# Patient Record
Sex: FEMALE
[De-identification: names, ages, dates, MRNs, and addresses within clinical notes are randomized per-mention and may not be internally consistent; named-entity substitution may affect disease eponyms.]

---

## 2018-06-04 ENCOUNTER — HOSPITAL ENCOUNTER (OUTPATIENT)
Dept: HOSPITAL 88 - OR | Age: 43
Discharge: HOME | End: 2018-06-04
Attending: INTERNAL MEDICINE
Payer: COMMERCIAL

## 2018-06-04 DIAGNOSIS — K44.9: ICD-10-CM

## 2018-06-04 DIAGNOSIS — K62.1: ICD-10-CM

## 2018-06-04 DIAGNOSIS — N20.0: ICD-10-CM

## 2018-06-04 DIAGNOSIS — Z12.11: Primary | ICD-10-CM

## 2018-06-04 DIAGNOSIS — K31.7: ICD-10-CM

## 2018-06-04 DIAGNOSIS — K92.89: ICD-10-CM

## 2018-06-04 DIAGNOSIS — K29.70: ICD-10-CM

## 2018-06-04 DIAGNOSIS — K64.8: ICD-10-CM

## 2018-06-04 DIAGNOSIS — K20.9: ICD-10-CM

## 2018-06-04 DIAGNOSIS — Z88.1: ICD-10-CM

## 2018-06-04 DIAGNOSIS — R03.0: ICD-10-CM

## 2018-06-04 DIAGNOSIS — Z80.0: ICD-10-CM

## 2018-06-04 PROCEDURE — 45384 COLONOSCOPY W/LESION REMOVAL: CPT

## 2018-06-04 PROCEDURE — 43239 EGD BIOPSY SINGLE/MULTIPLE: CPT

## 2018-06-04 PROCEDURE — 45378 DIAGNOSTIC COLONOSCOPY: CPT

## 2018-06-04 PROCEDURE — 81025 URINE PREGNANCY TEST: CPT

## 2018-06-04 NOTE — OPERATIVE REPORT
DATE OF PROCEDURE:  June 04, 2018 



REFERRING PHYSICIAN:  Kirill Otto MD  



PROCEDURES PERFORMED

1. Esophagogastroduodenoscopy with biopsies.  

2. Colonoscopy with polypectomy.  



INDICATIONS FOR EGD:  Epigastric pain.  



INDICATIONS FOR COLONOSCOPY:  Colorectal cancer screening.  Personal 

history of colon polyps.  Father with colon cancer.



MEDICATION:  Patient was done under MAC.  Please see anesthesiologist's 

note.



PROCEDURE:  With the patient in the left lateral decubitus position, the 

flexible fiberoptic Olympus gastroscope was introduced into the esophagus 

under direct visualization without any difficulty.  There was some patchy 

erythema noted in the distal esophagus.  A minute nodule was noted at the 

GE junction, and that was biopsied.  The scope was then advanced with ease 

into the stomach, traversing a small hiatal hernia.  Mucosa overlying the 

antrum revealed some patchy erythema and mild to moderate edema, and 

biopsies were obtained and sent to stain for H. pylori.  Multiple 

hyperplastic-appearing polyps were noted in the body of the stomach.  Some 

were partially excised with cold biopsy forceps.  A rather long, 

submucosal, serpiginous lesion was noted in the mid body along the anterior 

wall, and this needs to be further assessed with an EUS. The pylorus was of 

normal contour and shape.  It was intubated with ease, and the scope was 

advanced all the way to the 2nd portion of the duodenum.  The scope was 

then withdrawn slowly.  Mucosa overlying the proximal 2nd portion and the 

duodenal bulb appeared to be within normal limits.  The scope was then 

withdrawn back into the stomach and retroflexed.  Mucosa overlying the 

fundus appeared to be within normal limits.  The previously described 

hiatal hernia was also noted in the retroflexed position.  The scope was 

then straightened out.  It was subsequently withdrawn.  Patient tolerated 

the procedure well.



IMPRESSION

1. Distal esophagitis, mild.

2. Minute nodule at gastroesophageal junction, biopsied.

3. Small hiatal hernia.

4. Gastritis, biopsied.  Biopsies sent to stain for H. pylori.

5. Gastric polyps, body, hyperplastic-appearing, some partially excised 

with cold biopsy forceps.  

6. Serpiginous submucosal lesion, mid body anterior wall, needs 

endoscopic ultrasound for further evaluation.



PLAN:  Follow up histology.  Initiate Protonix 40 mg 1 p.o. q.a.m. a.c.



The patient was then turned around.  After adequate lubrication of the anal 

canal, a flexible fiberoptic Olympus colonoscope was inserted into the 

rectum with ease and advanced all the way to the cecum.  It was then 

withdrawn slowly.  Mucosa overlying the cecum, ascending colon, transverse, 

descending and sigmoid appeared to be within normal limits.  Three polyps 

were hot biopsied from the rectum.  The scope was then retroflexed into the 

distal rectum, and small internal hemorrhoids were noted, none of which was 

actively bleeding.  The scope was then straightened out.  It was 

subsequently withdrawn.  Patient tolerated the procedure well. 



IMPRESSION

1. Rectal polyps times 3, hot biopsied.

2. Internal hemorrhoids, none actively bleeding.  



PLAN:  Follow up histology.  Initiate high-fiber, low-fat diet.  Initiate 

high-fiber supplement.  

Patient might benefit from a followup colonoscopy in 3 years.  







DD:  06/04/2018 12:38

DT:  06/04/2018 12:48

Job#:  [id number] 



cc:KIRILL OTTO MD

## 2022-01-25 ENCOUNTER — HOSPITAL ENCOUNTER (EMERGENCY)
Dept: HOSPITAL 88 - ER | Age: 47
Discharge: HOME | End: 2022-01-25
Payer: COMMERCIAL

## 2022-01-25 VITALS — HEIGHT: 62 IN | BODY MASS INDEX: 33.49 KG/M2 | WEIGHT: 182 LBS

## 2022-01-25 DIAGNOSIS — R20.2: ICD-10-CM

## 2022-01-25 DIAGNOSIS — R20.0: Primary | ICD-10-CM

## 2022-01-25 DIAGNOSIS — R07.89: ICD-10-CM

## 2022-01-25 DIAGNOSIS — Z87.442: ICD-10-CM

## 2022-01-25 PROCEDURE — 93005 ELECTROCARDIOGRAM TRACING: CPT

## 2022-01-25 PROCEDURE — 99282 EMERGENCY DEPT VISIT SF MDM: CPT
